# Patient Record
Sex: FEMALE | Race: BLACK OR AFRICAN AMERICAN | NOT HISPANIC OR LATINO | Employment: STUDENT | URBAN - METROPOLITAN AREA
[De-identification: names, ages, dates, MRNs, and addresses within clinical notes are randomized per-mention and may not be internally consistent; named-entity substitution may affect disease eponyms.]

---
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
## 2024-11-07 ENCOUNTER — OFFICE VISIT (OUTPATIENT)
Dept: URGENT CARE | Facility: CLINIC | Age: 19
End: 2024-11-07
Payer: COMMERCIAL

## 2024-11-07 VITALS
HEIGHT: 64 IN | SYSTOLIC BLOOD PRESSURE: 126 MMHG | HEART RATE: 85 BPM | RESPIRATION RATE: 16 BRPM | TEMPERATURE: 99 F | BODY MASS INDEX: 28.17 KG/M2 | OXYGEN SATURATION: 98 % | WEIGHT: 165 LBS | DIASTOLIC BLOOD PRESSURE: 79 MMHG

## 2024-11-07 DIAGNOSIS — R09.81 NASAL CONGESTION: ICD-10-CM

## 2024-11-07 DIAGNOSIS — R53.83 FATIGUE, UNSPECIFIED TYPE: ICD-10-CM

## 2024-11-07 DIAGNOSIS — J06.9 VIRAL URI: Primary | ICD-10-CM

## 2024-11-07 DIAGNOSIS — R50.9 FEVER, UNSPECIFIED FEVER CAUSE: ICD-10-CM

## 2024-11-07 LAB
CTP QC/QA: YES
POC MOLECULAR INFLUENZA A AGN: NEGATIVE
POC MOLECULAR INFLUENZA B AGN: NEGATIVE

## 2024-11-07 PROCEDURE — 99499 UNLISTED E&M SERVICE: CPT | Mod: S$GLB,,, | Performed by: STUDENT IN AN ORGANIZED HEALTH CARE EDUCATION/TRAINING PROGRAM

## 2024-11-07 PROCEDURE — 87502 INFLUENZA DNA AMP PROBE: CPT | Mod: QW,,, | Performed by: STUDENT IN AN ORGANIZED HEALTH CARE EDUCATION/TRAINING PROGRAM

## 2024-11-07 RX ORDER — DEXBROMPHENIRAMINE MALEATE, PHENYLEPHRINE HYDROCHLORIDE 2; 7.5 MG/1; MG/1
1 TABLET ORAL
Qty: 14 TABLET | Refills: 0 | Status: SHIPPED | OUTPATIENT
Start: 2024-11-07

## 2024-11-07 NOTE — LETTER
November 7, 2024      Urgent Care - [address] [address]  Phone: [phone]  Fax: [phone]       Patient: Paulino Mathur   YOB: 2005  Date of Visit: 11/07/2024    To Whom It May Concern:    Brynn Mathur  was at [facility] on 11/07/2024. The patient may return to work/school on 11/11/24 with no restrictions. If you have any questions or concerns, or if I can be of further assistance, please do not hesitate to contact me.    Sincerely,    Flynn New MD

## 2024-11-07 NOTE — PROGRESS NOTES
"Subjective:      Patient ID: Paulino Mathur is a 19 y.o. female.    Vitals:  height is 5' 4" (1.626 m) and weight is 74.8 kg (165 lb). Her oral temperature is 98.5 °F (36.9 °C). Her blood pressure is 126/79 and her pulse is 85. Her respiration is 16 and oxygen saturation is 98%.     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 The current episode started yesterday. The problem is unchanged. Maximum temperature: low grade. Her pain is at a severity of 2/10. The pain is mild. Associated symptoms include congestion and headaches. Pertinent negatives include no chills, coughing or shortness of breath. Treatments tried: ibu.       Constitution: Positive for fever (low grade). Negative for chills.   HENT:  Positive for congestion and postnasal drip.    Cardiovascular:  Negative for chest pain.   Respiratory:  Negative for cough and shortness of breath.    Neurological:  Positive for headaches.      Objective:     Physical Exam   HENT:   Head: Normocephalic.   Ears:   Right Ear: Tympanic membrane normal.   Left Ear: Tympanic membrane normal.   Nose: Nose normal.   Mouth/Throat: Mucous membranes are moist. Oropharynx is clear.   Eyes: Conjunctivae are normal. Pupils are equal, round, and reactive to light.   Cardiovascular: Normal rate, regular rhythm and normal heart sounds.   Pulmonary/Chest: Effort normal.   Abdominal: Normal appearance.   Neurological: She is alert.   Psychiatric: Her behavior is normal. Mood normal.     Results for orders placed or performed in visit on 11/07/24   POCT Influenza A/B MOLECULAR    Collection Time: 11/07/24 10:10 AM   Result Value Ref Range    POC Molecular Influenza A Ag Negative Negative    POC Molecular Influenza B Ag Negative Negative     Acceptable " Yes        Assessment:     1. Viral URI    2. Nasal congestion    3. Fatigue, unspecified type    4. Fever, unspecified fever cause        Plan:       Viral URI  -     dexbrompheniramine-phenyleph (ALAHIST PE) 2-7.5 mg Tab; Take 1 tablet by mouth every 6 to 8 hours as needed (nasal congestion, post nasal drip).  Dispense: 14 tablet; Refill: 0    Nasal congestion  -     POCT Influenza A/B MOLECULAR  -     dexbrompheniramine-phenyleph (ALAHIST PE) 2-7.5 mg Tab; Take 1 tablet by mouth every 6 to 8 hours as needed (nasal congestion, post nasal drip).  Dispense: 14 tablet; Refill: 0    Fatigue, unspecified type  -     POCT Influenza A/B MOLECULAR  -     dexbrompheniramine-phenyleph (ALAHIST PE) 2-7.5 mg Tab; Take 1 tablet by mouth every 6 to 8 hours as needed (nasal congestion, post nasal drip).  Dispense: 14 tablet; Refill: 0    Fever, unspecified fever cause  -     POCT Influenza A/B MOLECULAR  -     dexbrompheniramine-phenyleph (ALAHIST PE) 2-7.5 mg Tab; Take 1 tablet by mouth every 6 to 8 hours as needed (nasal congestion, post nasal drip).  Dispense: 14 tablet; Refill: 0      General Instructions for Upper Respiratory Infection (URI):     Alternate Tylenol and Ibuprofen every 3 hrs for fever, pain and inflammation.   Avoid NSAIDs (Ibuprofen, Aleve, Motrin, Aspirin) if you are pregnant, or have advanced kidney disease or history of stomach ulcers/bleeding.     Sore throat/Post Nasal Drip:  Salt water gargles, chloraseptic spray, lozenges, or cough drops   Honey/lemon water or warm tea   Cepachol   Zantac will help if there is reflux from the post nasal drip and helpful to take at night     Sinus Congestion/Runny nose:  Zyrtec/Claritin/Allegra during the day and Benadryl at night as needed  Mucinex, Dayquil, or Coricidin   If you DO NOT have Hypertension (high blood pressure) or any history of palpitations, it is ok to take over the counter Sudafed or Mucinex D or Allegra-D or Claritin-D or Zyrtec-D.  If you do take  one of the above, it is ok to combine that with plain over the counter Mucinex or Allegra or Claritin or Zyrtec. If, for example, you are taking Zyrtec -D, you can combine that with Mucinex, but not Mucinex-D. If you are taking Mucinex-D, you can combine that with plain Allegra or Claritin or Zyrtec.  If you DO have Hypertension or palpitations, it is safe to take Coricidin HBP for relief of sinus symptoms.  Nasal saline spray reduces inflammation and dryness  Flonase OTC or Nasacort OTC to help decrease inflammation in nasal turbinates and allow sinuses to drain  Warm face compresses/hot showers as often as you can to open sinuses and allow to drain.   Vicks vapor rub and/or humidifier at night  Cold-eeze helps to reduce the duration of URI symptoms if taken early  Elderberry, Emergen-C, and/or Zinc to reduce duration of viral URI symptoms    Cough:  Robitussin or Delsym as needed  Cough drops  Vicks vapor rub and/or humidifier at night       Rest as much as you can     Your symptoms are likely viral and will typically last 7-10 days, maybe longer depending on how it affects your body.  You are contagious until day 5-7, so minimize contact with others to reduce the spread to others and stay home from work or school as we discussed. Dehydration is preventable but is one of the main reasons why you will feel so badly. Drink pedialyte, gatorade or propel. Stay hydrated.  Antibiotics are not needed unless a complication( such as Otitis Media, Bacterial sinus infection or pneumonia) develops. Taking antibiotics for Flu/Cold is not supported by evidence-based medicine and can expose you to unnecessary side effects of the medication, such as anaphylaxis, yeast infection and leads to antibiotic resistance.     Please follow up with your primary care provider within 5-7 days if your signs and symptoms have not resolved or worsen.  If your condition worsens or fails to improve we recommend that you receive another evaluation  at the emergency  room immediately or contact your primary medical clinic to discuss your concerns.  You must understand that you have received an Urgent Care treatment only and that you may be released before all of  your medical problems are known or treated. You, the patient, will arrange for follow up care as instructed.    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  Vitals WNL. PE was unremarkable. Pt likely has a viral URI. She was given some medications and was given otc recs. ED and return precautions were given. The pt vu.

## 2024-11-11 ENCOUNTER — OFFICE VISIT (OUTPATIENT)
Dept: URGENT CARE | Facility: CLINIC | Age: 19
End: 2024-11-11
Payer: COMMERCIAL

## 2024-11-11 VITALS
BODY MASS INDEX: 28.17 KG/M2 | RESPIRATION RATE: 16 BRPM | TEMPERATURE: 98 F | OXYGEN SATURATION: 98 % | DIASTOLIC BLOOD PRESSURE: 86 MMHG | HEIGHT: 64 IN | SYSTOLIC BLOOD PRESSURE: 124 MMHG | WEIGHT: 165 LBS | HEART RATE: 80 BPM

## 2024-11-11 DIAGNOSIS — H10.32 ACUTE BACTERIAL CONJUNCTIVITIS OF LEFT EYE: Primary | ICD-10-CM

## 2024-11-11 PROCEDURE — 99499 UNLISTED E&M SERVICE: CPT | Mod: S$GLB,,, | Performed by: STUDENT IN AN ORGANIZED HEALTH CARE EDUCATION/TRAINING PROGRAM

## 2024-11-11 RX ORDER — POLYMYXIN B SULFATE AND TRIMETHOPRIM 1; 10000 MG/ML; [USP'U]/ML
1 SOLUTION OPHTHALMIC EVERY 4 HOURS
Qty: 10 ML | Refills: 0 | Status: SHIPPED | OUTPATIENT
Start: 2024-11-11 | End: 2024-11-16

## 2024-11-11 NOTE — PATIENT INSTRUCTIONS
Please follow up with your primary care provider within 2-5 days if your signs and symptoms have not resolved or worsen.     If your condition worsens or fails to improve we recommend that you receive another evaluation at the emergency room immediately or contact your primary medical clinic to discuss your concerns.   You must understand that you have received an Urgent Care treatment only and that you may be released before all of your medical problems are known or treated. You, the patient, will arrange for follow up care as instructed.      PLEASE DO NOT WEAR CONTACTS AND ONLY USE EYE GLASSES WHILE YOU ARE BEING TREATED FOR PINK EYE.

## 2024-11-11 NOTE — LETTER
November 11, 2024      Urgent Care - [address] [address]  Phone: [phone]  Fax: [phone]       Patient: Paulino Mathur   YOB: 2005  Date of Visit: 11/11/2024    To Whom It May Concern:    Brynn Mathur  was at [facility] on 11/11/2024. The patient may return to work on 11/12/24 with no restrictions. If you have any questions or concerns, or if I can be of further assistance, please do not hesitate to contact me.    Sincerely,    Flynn New MD

## 2024-11-11 NOTE — PROGRESS NOTES
"Subjective:      Patient ID: Paulino Mathur is a 19 y.o. female.    Vitals:  height is 5' 4" (1.626 m) and weight is 74.8 kg (165 lb). Her temperature is 97.8 °F (36.6 °C). Her blood pressure is 124/86 and her pulse is 80. Her respiration is 16 and oxygen saturation is 98%.     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 She is also having congestion.   She works at a .     Eye Pain   The left eye is affected. This is a new problem. The current episode started today. There is Known exposure to pink eye. She Wears contacts. Associated symptoms include an eye discharge, itching and a recent URI. Pertinent negatives include no fever. She has tried nothing for the symptoms. The treatment provided no relief.     Constitution: Negative for fever.   Eyes:  Positive for eye discharge, eye itching and eye pain.      Objective:     Physical Exam   Eyes: Right eye exhibits no chemosis, no discharge, no exudate and no hordeolum. No foreign body present in the right eye. Left eye exhibits discharge and exudate. Left eye exhibits no chemosis and no hordeolum. No foreign body present in the left eye. Right conjunctiva is not injected. Right conjunctiva has no hemorrhage. Left conjunctiva is not injected. Left conjunctiva has no hemorrhage.     Vision Screening    Right eye Left eye Both eyes   Without correction      With correction 20/50 20/50 20/30      Assessment:     1. Acute bacterial conjunctivitis of left eye        Plan:       Acute bacterial conjunctivitis of left eye  -     polymyxin B sulf-trimethoprim (POLYTRIM) 10,000 unit- 1 mg/mL Drop; Place 1 drop into the left eye every 4 (four) hours. for 5 days  Dispense: 10 mL; Refill: 0      -handout given  -pt can wear contact after infection clears up    Please follow up with your primary care " provider within 2-5 days if your signs and symptoms have not resolved or worsen.     If your condition worsens or fails to improve we recommend that you receive another evaluation at the emergency room immediately or contact your primary medical clinic to discuss your concerns.   You must understand that you have received an Urgent Care treatment only and that you may be released before all of your medical problems are known or treated. You, the patient, will arrange for follow up care as instructed.      PLEASE DO NOT WEAR CONTACTS AND ONLY USE EYE GLASSES WHILE YOU ARE BEING TREATED FOR PINK EYE.  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 She is also having congestion.   She works at a .   Vitals and normal limits.  The patient was sent home with antibiotic eyedrops. ED and return precautions were given. The pt jose.

## 2024-12-06 ENCOUNTER — OFFICE VISIT (OUTPATIENT)
Dept: URGENT CARE | Facility: CLINIC | Age: 19
End: 2024-12-06
Payer: COMMERCIAL

## 2024-12-06 VITALS
OXYGEN SATURATION: 98 % | WEIGHT: 165 LBS | BODY MASS INDEX: 28.17 KG/M2 | TEMPERATURE: 99 F | HEART RATE: 95 BPM | HEIGHT: 64 IN | SYSTOLIC BLOOD PRESSURE: 131 MMHG | RESPIRATION RATE: 16 BRPM | DIASTOLIC BLOOD PRESSURE: 80 MMHG

## 2024-12-06 DIAGNOSIS — H18.823 CONTACT LENS OVERWEAR, BILATERAL: Primary | ICD-10-CM

## 2024-12-06 DIAGNOSIS — H57.89 REDNESS OF BOTH EYES: ICD-10-CM

## 2024-12-06 PROCEDURE — 99499 UNLISTED E&M SERVICE: CPT | Mod: S$GLB,,, | Performed by: STUDENT IN AN ORGANIZED HEALTH CARE EDUCATION/TRAINING PROGRAM

## 2024-12-06 RX ORDER — OFLOXACIN 3 MG/ML
1 SOLUTION/ DROPS OPHTHALMIC 4 TIMES DAILY
Qty: 10 ML | Refills: 0 | Status: SHIPPED | OUTPATIENT
Start: 2024-12-06 | End: 2024-12-11

## 2024-12-06 RX ORDER — AMOXICILLIN AND CLAVULANATE POTASSIUM 875; 125 MG/1; MG/1
TABLET, FILM COATED ORAL
COMMUNITY
Start: 2024-11-12

## 2024-12-06 RX ORDER — POLYMYXIN B SULFATE AND TRIMETHOPRIM 1; 10000 MG/ML; [USP'U]/ML
SOLUTION OPHTHALMIC
COMMUNITY
Start: 2024-11-11 | End: 2024-12-06

## 2024-12-06 RX ORDER — PROMETHAZINE HYDROCHLORIDE AND DEXTROMETHORPHAN HYDROBROMIDE 6.25; 15 MG/5ML; MG/5ML
5 SYRUP ORAL 4 TIMES DAILY PRN
COMMUNITY
Start: 2024-10-27 | End: 2024-12-06

## 2024-12-06 NOTE — PROGRESS NOTES
"Subjective:      Patient ID: Paulino Mathur is a 19 y.o. female.    Vitals:  height is 5' 4" (1.626 m) and weight is 74.8 kg (165 lb). Her oral temperature is 98.5 °F (36.9 °C). Her blood pressure is 131/80 and her pulse is 95. Her respiration is 16 and oxygen saturation is 98%.     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 This is a new problem. The current episode started today. The problem occurs constantly. The problem has been unchanged. There was no injury mechanism. The pain is at a severity of 7/10. The pain is moderate. There is No known exposure to pink eye. She Wears contacts. Associated symptoms include eye redness and photophobia. Pertinent negatives include no eye discharge or itching. She has tried commercial eye wash for the symptoms. The treatment provided mild relief.       Eyes:  Positive for eye redness and photophobia. Negative for eye discharge and eye itching.      Objective:     Physical Exam   Eyes: Right eye exhibits no chemosis, no discharge, no exudate and no hordeolum. No foreign body present in the right eye. Left eye exhibits no chemosis, no discharge, no exudate and no hordeolum. No foreign body present in the left eye. Right conjunctiva is injected. Right conjunctiva has no hemorrhage. Left conjunctiva is injected. Left conjunctiva has no hemorrhage.   Slit lamp exam:       The right eye shows no corneal abrasion.        The left eye shows no corneal abrasion.      Comments: Fluorescein test was negative  Patient's eyes are sensitive to light         Assessment: " "    1. Contact lens overwear, bilateral    2. Redness of both eyes        Plan:       Contact lens overwear, bilateral  -     ofloxacin (OCUFLOX) 0.3 % ophthalmic solution; Place 1 drop into both eyes 4 (four) times daily. for 5 days  Dispense: 10 mL; Refill: 0    Redness of both eyes  -     ofloxacin (OCUFLOX) 0.3 % ophthalmic solution; Place 1 drop into both eyes 4 (four) times daily. for 5 days  Dispense: 10 mL; Refill: 0       Follow recommended wearing schedule.    Do not substitute sterile saline solutions for multipurpose solutions.    Rub and rinse your contact lenses as directed by your eye care professional.    Do not "top-off" the solutions in your case. Always discard all of the leftover contact lens solution after each use. Never reuse any lens solution.    Clean, rinse, and air-dry your lens case each time lenses are removed.    Do not expose your contact lenses to any water: tap, bottled, distilled, lake, or ocean water.    Contact your eye care professional if you experience any symptoms of eye irritation or infection.   Please follow up with your primary care provider within 2-5 days if your signs and symptoms have not resolved or worsen.     If your condition worsens or fails to improve we recommend that you receive another evaluation at the emergency room immediately or contact your primary medical clinic to discuss your concerns.   You must understand that you have received an Urgent Care treatment only and that you may be released before all of your medical problems are known or treated. You, the patient, will arrange for follow up care as instructed.      Please apply the antibiotic solution to both eyes for 5 days.  Please avoid using your contact lenses for the next 1 week.  Please use her glasses.  If you continue to have issues then please follow up in the clinic or follow up with your primary care physician.    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  Physical Exam   Eyes: Right eye exhibits no chemosis, no discharge, no exudate and no hordeolum. No foreign body present in the right eye. Left eye exhibits no chemosis, no discharge, no exudate and no hordeolum. No foreign body present in the left eye. Right conjunctiva is injected. Right conjunctiva has no hemorrhage. Left conjunctiva is injected. Left conjunctiva has no hemorrhage.   Slit lamp exam:       The right eye shows no corneal abrasion.        The left eye shows no corneal abrasion.      Comments: Fluorescein test was negative  Patient's eyes are sensitive to light   Patient was given antibiotic eyedrops she just in case to cover for a possible corneal abrasion.  Patient will need to stop wearing her contact lenses for 1-2 weeks until the eye irritation results.  I also discussed with the patient that she needs to not wear her contact lenses overnight.  ED and return precautions were given. The pt jose.

## 2024-12-06 NOTE — PATIENT INSTRUCTIONS
" Follow recommended wearing schedule.    Do not substitute sterile saline solutions for multipurpose solutions.    Rub and rinse your contact lenses as directed by your eye care professional.    Do not "top-off" the solutions in your case. Always discard all of the leftover contact lens solution after each use. Never reuse any lens solution.    Clean, rinse, and air-dry your lens case each time lenses are removed.    Do not expose your contact lenses to any water: tap, bottled, distilled, lake, or ocean water.    Contact your eye care professional if you experience any symptoms of eye irritation or infection.   Please follow up with your primary care provider within 2-5 days if your signs and symptoms have not resolved or worsen.     If your condition worsens or fails to improve we recommend that you receive another evaluation at the emergency room immediately or contact your primary medical clinic to discuss your concerns.   You must understand that you have received an Urgent Care treatment only and that you may be released before all of your medical problems are known or treated. You, the patient, will arrange for follow up care as instructed.      Please apply the antibiotic solution to both eyes for 5 days.  Please avoid using your contact lenses for the next 1 week.  Please use her glasses.  If you continue to have issues then please follow up in the clinic or follow up with your primary care physician.  "

## 2025-05-01 ENCOUNTER — OFFICE VISIT (OUTPATIENT)
Dept: URGENT CARE | Facility: CLINIC | Age: 20
End: 2025-05-01
Payer: COMMERCIAL

## 2025-05-01 VITALS
TEMPERATURE: 98 F | HEART RATE: 108 BPM | OXYGEN SATURATION: 98 % | DIASTOLIC BLOOD PRESSURE: 87 MMHG | BODY MASS INDEX: 28.17 KG/M2 | RESPIRATION RATE: 16 BRPM | SYSTOLIC BLOOD PRESSURE: 128 MMHG | HEIGHT: 64 IN | WEIGHT: 165 LBS

## 2025-05-01 DIAGNOSIS — R05.9 COUGH, UNSPECIFIED TYPE: ICD-10-CM

## 2025-05-01 DIAGNOSIS — J01.90 ACUTE RHINOSINUSITIS: Primary | ICD-10-CM

## 2025-05-01 DIAGNOSIS — R09.81 NASAL CONGESTION: ICD-10-CM

## 2025-05-01 LAB
CTP QC/QA: YES
CTP QC/QA: YES
POC MOLECULAR INFLUENZA A AGN: NEGATIVE
POC MOLECULAR INFLUENZA B AGN: NEGATIVE
SARS-COV-2 RDRP RESP QL NAA+PROBE: NEGATIVE

## 2025-05-01 RX ORDER — BROMPHENIRAMINE MALEATE, PSEUDOEPHEDRINE HYDROCHLORIDE, AND DEXTROMETHORPHAN HYDROBROMIDE 2; 30; 10 MG/5ML; MG/5ML; MG/5ML
10 SYRUP ORAL EVERY 4 HOURS PRN
Qty: 120 ML | Refills: 0 | Status: SHIPPED | OUTPATIENT
Start: 2025-05-01

## 2025-05-01 NOTE — PROGRESS NOTES
"Subjective:      Patient ID: Paulino Mathur is a 19 y.o. female.    Vitals:  height is 5' 4" (1.626 m) and weight is 74.8 kg (165 lb). Her temperature is 98.3 °F (36.8 °C). Her blood pressure is 128/87 and her pulse is 108. Her respiration is 16 and oxygen saturation is 98%.     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      Cough  Associated symptoms include chills, a fever, headaches, myalgias, postnasal drip and a sore throat. Pertinent negatives include no rash.   Sinus Problem  Associated symptoms include chills, congestion, coughing, diaphoresis, headaches, sinus pressure, sneezing and a sore throat.       Constitution: Positive for chills, sweating and fever.   HENT:  Positive for congestion, postnasal drip, sinus pressure and sore throat.    Respiratory:  Positive for cough.    Musculoskeletal:  Positive for muscle ache.   Skin:  Negative for color change, pale and rash.   Allergic/Immunologic: Positive for sneezing.   Neurological:  Positive for headaches. Negative for disorientation and altered mental status.   Psychiatric/Behavioral:  Negative for altered mental status, disorientation and confusion.       Objective:     Physical Exam   Constitutional: She is oriented to person, place, and time. She appears well-developed. She is cooperative.  Non-toxic appearance. She does not appear ill. No distress.   HENT:   Head: Normocephalic and atraumatic.   Ears:   Right Ear: Hearing normal.   Left Ear: Hearing normal.   Nose: Mucosal edema, rhinorrhea and congestion present. No nasal deformity. No epistaxis. Right sinus exhibits maxillary sinus tenderness. Right sinus exhibits no frontal sinus tenderness. Left sinus exhibits maxillary sinus tenderness. Left sinus exhibits no frontal sinus tenderness.   Mouth/Throat: Uvula is midline and mucous membranes are normal. Mucous " membranes are moist. No trismus in the jaw. Normal dentition. No uvula swelling. Posterior oropharyngeal erythema and cobblestoning present. No oropharyngeal exudate or posterior oropharyngeal edema.   Eyes: Conjunctivae and lids are normal. No scleral icterus.   Neck: Trachea normal and phonation normal. Neck supple. No edema present. No erythema present. No neck rigidity present.   Cardiovascular: Normal rate, regular rhythm, normal heart sounds and normal pulses.   Pulmonary/Chest: Effort normal and breath sounds normal. No respiratory distress. She has no decreased breath sounds. She has no rhonchi.   Abdominal: Normal appearance.   Musculoskeletal: Normal range of motion.         General: No deformity. Normal range of motion.   Lymphadenopathy:     She has cervical adenopathy.        Right cervical: Superficial cervical adenopathy present.        Left cervical: Superficial cervical adenopathy present.   Neurological: She is alert and oriented to person, place, and time. She exhibits normal muscle tone. Coordination normal.   Skin: Skin is warm, dry, intact, not diaphoretic and not pale.   Psychiatric: Her speech is normal and behavior is normal. Judgment and thought content normal.   Nursing note and vitals reviewed.      Assessment:     1. Acute rhinosinusitis    2. Cough, unspecified type    3. Nasal congestion        Plan:         Office Visit on 05/01/2025   Component Date Value Ref Range Status    POC Rapid COVID 05/01/2025 Negative  Negative Final     Acceptable 05/01/2025 Yes   Final    POC Molecular Influenza A Ag 05/01/2025 Negative  Negative Final    POC Molecular Influenza B Ag 05/01/2025 Negative  Negative Final     Acceptable 05/01/2025 Yes   Final             Acute rhinosinusitis  -     brompheniramine-pseudoeph-DM (BROMFED DM) 2-30-10 mg/5 mL Syrp; Take 10 mLs by mouth every 4 (four) hours as needed (cough, congestion, sinus drainage).  Dispense: 120 mL; Refill:  0    Cough, unspecified type  -     POCT COVID-19 Rapid Screening  -     POCT Influenza A/B MOLECULAR  -     brompheniramine-pseudoeph-DM (BROMFED DM) 2-30-10 mg/5 mL Syrp; Take 10 mLs by mouth every 4 (four) hours as needed (cough, congestion, sinus drainage).  Dispense: 120 mL; Refill: 0    Nasal congestion  -     POCT COVID-19 Rapid Screening  -     POCT Influenza A/B MOLECULAR  -     brompheniramine-pseudoeph-DM (BROMFED DM) 2-30-10 mg/5 mL Syrp; Take 10 mLs by mouth every 4 (four) hours as needed (cough, congestion, sinus drainage).  Dispense: 120 mL; Refill: 0        .  Patient Instructions   Please follow up with your primary care provider within 2-5 days if your signs and symptoms have not resolved or worsen.     If your condition worsens or fails to improve we recommend that you receive another evaluation at the emergency room immediately or contact your primary medical clinic to discuss your concerns.   You must understand that you have received an Urgent Care treatment only and that you may be released before all of your medical problems are known or treated. You, the patient, will arrange for follow up care as instructed.       Alternate tylenol/Motrin as needed for fever.  Increase oral fluids.  Rest.    OTC throat pain mixture Instructions    Combine the following ingredients:    10 ml of Maalox (Aluminum-magnesium hydroxide-simethicone 200-200-20 mg/5 ml) PLUS    10 ml of Tylenol (Acetaminophen 160 mg/5 ml) PLUS     5 ml of Benadryl (Diphenhydramine 12.5 mg/5 ml)     Take mixture as a single dose; gargle then swallow every 6 hours as needed for throat pain relief.  Refrigerate mixture for optimal comfort/pain relief.

## 2025-05-01 NOTE — PATIENT INSTRUCTIONS
Please follow up with your primary care provider within 2-5 days if your signs and symptoms have not resolved or worsen.     If your condition worsens or fails to improve we recommend that you receive another evaluation at the emergency room immediately or contact your primary medical clinic to discuss your concerns.   You must understand that you have received an Urgent Care treatment only and that you may be released before all of your medical problems are known or treated. You, the patient, will arrange for follow up care as instructed.       Alternate tylenol/Motrin as needed for fever.  Increase oral fluids.  Rest.    OTC throat pain mixture Instructions    Combine the following ingredients:    10 ml of Maalox (Aluminum-magnesium hydroxide-simethicone 200-200-20 mg/5 ml) PLUS    10 ml of Tylenol (Acetaminophen 160 mg/5 ml) PLUS     5 ml of Benadryl (Diphenhydramine 12.5 mg/5 ml)     Take mixture as a single dose; gargle then swallow every 6 hours as needed for throat pain relief.  Refrigerate mixture for optimal comfort/pain relief.

## 2025-05-01 NOTE — LETTER
May 1, 2025      Urgent Care - [address] [address]  Phone: [phone]  Fax: [phone]       Patient: Paulino Mathur   YOB: 2005  Date of Visit: 05/01/2025    To Whom It May Concern:    Brynn Mathur  was at [facility] on 05/01/2025. The patient may return to work/school on 5/2/2025   with no restrictions. If you have any questions or concerns, or if I can be of further assistance, please do not hesitate to contact me.    Sincerely,    Raysa Cordoba NP

## 2025-07-17 DIAGNOSIS — Z01.419 ENCOUNTER FOR ANNUAL ROUTINE GYNECOLOGICAL EXAMINATION: Primary | ICD-10-CM
